# Patient Record
Sex: FEMALE | Race: WHITE | URBAN - METROPOLITAN AREA
[De-identification: names, ages, dates, MRNs, and addresses within clinical notes are randomized per-mention and may not be internally consistent; named-entity substitution may affect disease eponyms.]

---

## 2017-10-09 NOTE — PATIENT DISCUSSION
Allergic Conjunctivitis Counseling: The clinical exam and history are most compatible with an allergic conjunctivitis or allergy symptoms. Drops were prescribed to use when symptoms are present. The patient understands that continued monitoring is important to make sure the symptoms resolve with current therapy and to check for changes that may suggest an alternative diagnosis. Return for follow-up as scheduled or sooner if symptoms worsen.

## 2020-04-22 NOTE — PATIENT DISCUSSION
CONJUNCTIVITIS (ALLERGIC), OU__:  RECOMMENDED ____ZADUTOR__________. RETURN FOR FOLLOW-UP AS SCHEDULED.

## 2022-06-14 ENCOUNTER — ESTABLISHED PATIENT (OUTPATIENT)
Facility: LOCATION | Age: 82
End: 2022-06-14

## 2022-06-14 DIAGNOSIS — H43.813: ICD-10-CM

## 2022-06-14 DIAGNOSIS — Z96.1: ICD-10-CM

## 2022-06-14 DIAGNOSIS — H16.223: ICD-10-CM

## 2022-06-14 PROCEDURE — 92014 COMPRE OPH EXAM EST PT 1/>: CPT

## 2022-06-14 PROCEDURE — 83861 MICROFLUID ANALY TEARS: CPT

## 2022-06-14 ASSESSMENT — VISUAL ACUITY
OD_SC: 20/50-2
OD_SC: J2
OD_PH: 20/40-2
OS_SC: J7
OS_SC: 20/40

## 2022-06-14 ASSESSMENT — TONOMETRY
OD_IOP_MMHG: 12
OS_IOP_MMHG: 11

## 2023-06-14 ENCOUNTER — ESTABLISHED PATIENT (OUTPATIENT)
Facility: LOCATION | Age: 83
End: 2023-06-14

## 2023-06-14 DIAGNOSIS — H16.223: ICD-10-CM

## 2023-06-14 PROCEDURE — 83861 MICROFLUID ANALY TEARS: CPT

## 2023-06-14 PROCEDURE — 92014 COMPRE OPH EXAM EST PT 1/>: CPT

## 2023-06-14 ASSESSMENT — VISUAL ACUITY
OD_SC: 20/50-1
OS_SC: 20/30
OD_PH: 20/30-2
OS_SC: J5
OD_SC: J1

## 2023-06-14 ASSESSMENT — TONOMETRY
OD_IOP_MMHG: 9
OS_IOP_MMHG: 9

## 2024-06-17 ENCOUNTER — ESTABLISHED PATIENT (OUTPATIENT)
Facility: LOCATION | Age: 84
End: 2024-06-17

## 2024-06-17 DIAGNOSIS — H43.813: ICD-10-CM

## 2024-06-17 DIAGNOSIS — H16.223: ICD-10-CM

## 2024-06-17 DIAGNOSIS — Z96.1: ICD-10-CM

## 2024-06-17 PROCEDURE — 92014 COMPRE OPH EXAM EST PT 1/>: CPT

## 2024-06-17 ASSESSMENT — VISUAL ACUITY
OD_SC: 20/70
OS_SC: J7
OD_PH: 20/40
OS_SC: 20/30
OD_SC: J2

## 2024-06-17 ASSESSMENT — TONOMETRY
OD_IOP_MMHG: 14
OS_IOP_MMHG: 12

## 2025-06-23 ENCOUNTER — COMPREHENSIVE EXAM (OUTPATIENT)
Age: 85
End: 2025-06-23

## 2025-06-23 DIAGNOSIS — Z96.1: ICD-10-CM

## 2025-06-23 DIAGNOSIS — H52.223: ICD-10-CM

## 2025-06-23 DIAGNOSIS — H52.13: ICD-10-CM

## 2025-06-23 DIAGNOSIS — H43.813: ICD-10-CM

## 2025-06-23 DIAGNOSIS — H16.223: ICD-10-CM

## 2025-06-23 DIAGNOSIS — H52.4: ICD-10-CM

## 2025-06-23 PROCEDURE — 92015KEC REFRACTION KEC

## 2025-06-23 PROCEDURE — 92014 COMPRE OPH EXAM EST PT 1/>: CPT
